# Patient Record
Sex: MALE | Race: WHITE | Employment: FULL TIME | URBAN - METROPOLITAN AREA
[De-identification: names, ages, dates, MRNs, and addresses within clinical notes are randomized per-mention and may not be internally consistent; named-entity substitution may affect disease eponyms.]

---

## 2020-11-05 ENCOUNTER — APPOINTMENT (OUTPATIENT)
Dept: GENERAL RADIOLOGY | Age: 55
DRG: 168 | End: 2020-11-05
Payer: COMMERCIAL

## 2020-11-05 ENCOUNTER — HOSPITAL ENCOUNTER (INPATIENT)
Age: 55
LOS: 1 days | Discharge: HOME | DRG: 168 | End: 2020-11-07
Attending: EMERGENCY MEDICINE | Admitting: INTERNAL MEDICINE
Payer: COMMERCIAL

## 2020-11-05 ENCOUNTER — APPOINTMENT (OUTPATIENT)
Dept: CT IMAGING | Age: 55
DRG: 168 | End: 2020-11-05
Payer: COMMERCIAL

## 2020-11-05 LAB
A/G RATIO: 1.4 (ref 1.1–2.2)
ALBUMIN SERPL-MCNC: 3.8 G/DL (ref 3.4–5)
ALP BLD-CCNC: 75 U/L (ref 40–129)
ALT SERPL-CCNC: 64 U/L (ref 10–40)
ANION GAP SERPL CALCULATED.3IONS-SCNC: 17 MMOL/L (ref 3–16)
AST SERPL-CCNC: 83 U/L (ref 15–37)
BASOPHILS ABSOLUTE: 0 K/UL (ref 0–0.2)
BASOPHILS RELATIVE PERCENT: 0.4 %
BILIRUB SERPL-MCNC: 0.4 MG/DL (ref 0–1)
BUN BLDV-MCNC: 15 MG/DL (ref 7–20)
CALCIUM SERPL-MCNC: 8.7 MG/DL (ref 8.3–10.6)
CHLORIDE BLD-SCNC: 101 MMOL/L (ref 99–110)
CO2: 20 MMOL/L (ref 21–32)
CREAT SERPL-MCNC: 1 MG/DL (ref 0.9–1.3)
EOSINOPHILS ABSOLUTE: 0 K/UL (ref 0–0.6)
EOSINOPHILS RELATIVE PERCENT: 0.1 %
GFR AFRICAN AMERICAN: >60
GFR NON-AFRICAN AMERICAN: >60
GLOBULIN: 2.7 G/DL
GLUCOSE BLD-MCNC: 105 MG/DL (ref 70–99)
HCT VFR BLD CALC: 39.5 % (ref 40.5–52.5)
HEMOGLOBIN: 13.5 G/DL (ref 13.5–17.5)
LYMPHOCYTES ABSOLUTE: 1.7 K/UL (ref 1–5.1)
LYMPHOCYTES RELATIVE PERCENT: 23.9 %
MAGNESIUM: 1.9 MG/DL (ref 1.8–2.4)
MCH RBC QN AUTO: 32.4 PG (ref 26–34)
MCHC RBC AUTO-ENTMCNC: 34.1 G/DL (ref 31–36)
MCV RBC AUTO: 95 FL (ref 80–100)
MONOCYTES ABSOLUTE: 0.5 K/UL (ref 0–1.3)
MONOCYTES RELATIVE PERCENT: 7 %
NEUTROPHILS ABSOLUTE: 5 K/UL (ref 1.7–7.7)
NEUTROPHILS RELATIVE PERCENT: 68.6 %
PDW BLD-RTO: 14.5 % (ref 12.4–15.4)
PLATELET # BLD: 175 K/UL (ref 135–450)
PMV BLD AUTO: 7.5 FL (ref 5–10.5)
POTASSIUM SERPL-SCNC: 3.3 MMOL/L (ref 3.5–5.1)
RBC # BLD: 4.16 M/UL (ref 4.2–5.9)
SARS-COV-2, NAAT: NOT DETECTED
SODIUM BLD-SCNC: 138 MMOL/L (ref 136–145)
TOTAL PROTEIN: 6.5 G/DL (ref 6.4–8.2)
TROPONIN: 0.01 NG/ML
WBC # BLD: 7.3 K/UL (ref 4–11)

## 2020-11-05 PROCEDURE — 93005 ELECTROCARDIOGRAM TRACING: CPT | Performed by: EMERGENCY MEDICINE

## 2020-11-05 PROCEDURE — 12002 RPR S/N/AX/GEN/TRNK2.6-7.5CM: CPT

## 2020-11-05 PROCEDURE — 72125 CT NECK SPINE W/O DYE: CPT

## 2020-11-05 PROCEDURE — 99285 EMERGENCY DEPT VISIT HI MDM: CPT

## 2020-11-05 PROCEDURE — 80053 COMPREHEN METABOLIC PANEL: CPT

## 2020-11-05 PROCEDURE — 96360 HYDRATION IV INFUSION INIT: CPT

## 2020-11-05 PROCEDURE — 2580000003 HC RX 258: Performed by: NURSE PRACTITIONER

## 2020-11-05 PROCEDURE — 84484 ASSAY OF TROPONIN QUANT: CPT

## 2020-11-05 PROCEDURE — 6370000000 HC RX 637 (ALT 250 FOR IP): Performed by: NURSE PRACTITIONER

## 2020-11-05 PROCEDURE — 83735 ASSAY OF MAGNESIUM: CPT

## 2020-11-05 PROCEDURE — 71046 X-RAY EXAM CHEST 2 VIEWS: CPT

## 2020-11-05 PROCEDURE — 70450 CT HEAD/BRAIN W/O DYE: CPT

## 2020-11-05 PROCEDURE — 90715 TDAP VACCINE 7 YRS/> IM: CPT | Performed by: NURSE PRACTITIONER

## 2020-11-05 PROCEDURE — 90471 IMMUNIZATION ADMIN: CPT | Performed by: NURSE PRACTITIONER

## 2020-11-05 PROCEDURE — 85025 COMPLETE CBC W/AUTO DIFF WBC: CPT

## 2020-11-05 PROCEDURE — 6360000002 HC RX W HCPCS: Performed by: NURSE PRACTITIONER

## 2020-11-05 PROCEDURE — U0002 COVID-19 LAB TEST NON-CDC: HCPCS

## 2020-11-05 RX ORDER — 0.9 % SODIUM CHLORIDE 0.9 %
1000 INTRAVENOUS SOLUTION INTRAVENOUS ONCE
Status: COMPLETED | OUTPATIENT
Start: 2020-11-05 | End: 2020-11-05

## 2020-11-05 RX ORDER — LEVOTHYROXINE SODIUM ANHYDROUS 100 UG/5ML
50 INJECTION, POWDER, LYOPHILIZED, FOR SOLUTION INTRAVENOUS DAILY
Status: ON HOLD | COMMUNITY
End: 2020-11-07 | Stop reason: HOSPADM

## 2020-11-05 RX ORDER — HYDROCHLOROTHIAZIDE 12.5 MG/1
12.5 CAPSULE, GELATIN COATED ORAL DAILY
COMMUNITY

## 2020-11-05 RX ORDER — AMLODIPINE BESYLATE 10 MG/1
10 TABLET ORAL DAILY
COMMUNITY

## 2020-11-05 RX ORDER — POTASSIUM CHLORIDE 20 MEQ/1
40 TABLET, EXTENDED RELEASE ORAL ONCE
Status: COMPLETED | OUTPATIENT
Start: 2020-11-05 | End: 2020-11-05

## 2020-11-05 RX ORDER — PRAVASTATIN SODIUM 10 MG
10 TABLET ORAL DAILY
COMMUNITY

## 2020-11-05 RX ADMIN — TETANUS TOXOID, REDUCED DIPHTHERIA TOXOID AND ACELLULAR PERTUSSIS VACCINE, ADSORBED 0.5 ML: 5; 2.5; 8; 8; 2.5 SUSPENSION INTRAMUSCULAR at 22:15

## 2020-11-05 RX ADMIN — POTASSIUM CHLORIDE 40 MEQ: 20 TABLET, EXTENDED RELEASE ORAL at 23:20

## 2020-11-05 RX ADMIN — SODIUM CHLORIDE 1000 ML: 9 INJECTION, SOLUTION INTRAVENOUS at 22:12

## 2020-11-06 ENCOUNTER — APPOINTMENT (OUTPATIENT)
Dept: CT IMAGING | Age: 55
DRG: 168 | End: 2020-11-06
Payer: COMMERCIAL

## 2020-11-06 ENCOUNTER — APPOINTMENT (OUTPATIENT)
Dept: VASCULAR LAB | Age: 55
DRG: 168 | End: 2020-11-06
Payer: COMMERCIAL

## 2020-11-06 PROBLEM — E03.9 HYPOTHYROIDISM, ACQUIRED: Status: ACTIVE | Noted: 2019-01-16

## 2020-11-06 PROBLEM — E66.9 OBESITY: Status: ACTIVE | Noted: 2020-11-06

## 2020-11-06 PROBLEM — R55 SYNCOPE: Status: ACTIVE | Noted: 2020-11-06

## 2020-11-06 PROBLEM — R55 SYNCOPE AND COLLAPSE: Status: ACTIVE | Noted: 2020-11-06

## 2020-11-06 LAB
APTT: 27.9 SEC (ref 24.2–36.2)
BILIRUBIN URINE: ABNORMAL
BLOOD, URINE: NEGATIVE
CLARITY: CLEAR
COLOR: YELLOW
EKG ATRIAL RATE: 110 BPM
EKG DIAGNOSIS: NORMAL
EKG P AXIS: 45 DEGREES
EKG P-R INTERVAL: 138 MS
EKG Q-T INTERVAL: 380 MS
EKG QRS DURATION: 106 MS
EKG QTC CALCULATION (BAZETT): 514 MS
EKG R AXIS: -61 DEGREES
EKG T AXIS: -16 DEGREES
EKG VENTRICULAR RATE: 110 BPM
FIBRINOGEN: 374 MG/DL (ref 200–397)
GLUCOSE URINE: NEGATIVE MG/DL
KETONES, URINE: >=80 MG/DL
LEUKOCYTE ESTERASE, URINE: NEGATIVE
LV EF: 55 %
LVEF MODALITY: NORMAL
MICROSCOPIC EXAMINATION: ABNORMAL
NITRITE, URINE: NEGATIVE
PH UA: 5.5 (ref 5–8)
PROTEIN UA: NEGATIVE MG/DL
SPECIFIC GRAVITY UA: 1.02 (ref 1–1.03)
TROPONIN: <0.01 NG/ML
TROPONIN: <0.01 NG/ML
URINE REFLEX TO CULTURE: ABNORMAL
URINE TYPE: ABNORMAL
UROBILINOGEN, URINE: 0.2 E.U./DL

## 2020-11-06 PROCEDURE — 2709999900 HC NON-CHARGEABLE SUPPLY

## 2020-11-06 PROCEDURE — 6360000002 HC RX W HCPCS: Performed by: INTERNAL MEDICINE

## 2020-11-06 PROCEDURE — 2500000003 HC RX 250 WO HCPCS: Performed by: INTERNAL MEDICINE

## 2020-11-06 PROCEDURE — 2580000003 HC RX 258: Performed by: SURGERY

## 2020-11-06 PROCEDURE — 6360000002 HC RX W HCPCS

## 2020-11-06 PROCEDURE — 37211 THROMBOLYTIC ART THERAPY: CPT

## 2020-11-06 PROCEDURE — 02FR3Z0 FRAGMENTATION OF LEFT PULMONARY ARTERY, PERCUTANEOUS APPROACH, ULTRASONIC: ICD-10-PCS | Performed by: SURGERY

## 2020-11-06 PROCEDURE — 93010 ELECTROCARDIOGRAM REPORT: CPT | Performed by: INTERNAL MEDICINE

## 2020-11-06 PROCEDURE — 2000000000 HC ICU R&B

## 2020-11-06 PROCEDURE — 36014 PLACE CATHETER IN ARTERY: CPT

## 2020-11-06 PROCEDURE — 02FQ3Z0 FRAGMENTATION OF RIGHT PULMONARY ARTERY, PERCUTANEOUS APPROACH, ULTRASONIC: ICD-10-PCS | Performed by: SURGERY

## 2020-11-06 PROCEDURE — 2580000003 HC RX 258: Performed by: INTERNAL MEDICINE

## 2020-11-06 PROCEDURE — 93970 EXTREMITY STUDY: CPT

## 2020-11-06 PROCEDURE — 81003 URINALYSIS AUTO W/O SCOPE: CPT

## 2020-11-06 PROCEDURE — 85384 FIBRINOGEN ACTIVITY: CPT

## 2020-11-06 PROCEDURE — 6360000002 HC RX W HCPCS: Performed by: SURGERY

## 2020-11-06 PROCEDURE — 84484 ASSAY OF TROPONIN QUANT: CPT

## 2020-11-06 PROCEDURE — 36592 COLLECT BLOOD FROM PICC: CPT

## 2020-11-06 PROCEDURE — C1769 GUIDE WIRE: HCPCS

## 2020-11-06 PROCEDURE — 71260 CT THORAX DX C+: CPT

## 2020-11-06 PROCEDURE — 99252 IP/OBS CONSLTJ NEW/EST SF 35: CPT | Performed by: SURGERY

## 2020-11-06 PROCEDURE — 36415 COLL VENOUS BLD VENIPUNCTURE: CPT

## 2020-11-06 PROCEDURE — 36591 DRAW BLOOD OFF VENOUS DEVICE: CPT

## 2020-11-06 PROCEDURE — C1887 CATHETER, GUIDING: HCPCS

## 2020-11-06 PROCEDURE — 2500000003 HC RX 250 WO HCPCS: Performed by: SURGERY

## 2020-11-06 PROCEDURE — C1751 CATH, INF, PER/CENT/MIDLINE: HCPCS

## 2020-11-06 PROCEDURE — 6360000004 HC RX CONTRAST MEDICATION: Performed by: INTERNAL MEDICINE

## 2020-11-06 PROCEDURE — 99152 MOD SED SAME PHYS/QHP 5/>YRS: CPT | Performed by: SURGERY

## 2020-11-06 PROCEDURE — 37211 THROMBOLYTIC ART THERAPY: CPT | Performed by: SURGERY

## 2020-11-06 PROCEDURE — 36014 PLACE CATHETER IN ARTERY: CPT | Performed by: SURGERY

## 2020-11-06 PROCEDURE — 85730 THROMBOPLASTIN TIME PARTIAL: CPT

## 2020-11-06 PROCEDURE — 93306 TTE W/DOPPLER COMPLETE: CPT

## 2020-11-06 PROCEDURE — 76937 US GUIDE VASCULAR ACCESS: CPT | Performed by: SURGERY

## 2020-11-06 PROCEDURE — C1894 INTRO/SHEATH, NON-LASER: HCPCS

## 2020-11-06 RX ORDER — SODIUM CHLORIDE 0.9 % (FLUSH) 0.9 %
10 SYRINGE (ML) INJECTION EVERY 12 HOURS SCHEDULED
Status: DISCONTINUED | OUTPATIENT
Start: 2020-11-06 | End: 2020-11-07 | Stop reason: HOSPADM

## 2020-11-06 RX ORDER — ONDANSETRON 2 MG/ML
4 INJECTION INTRAMUSCULAR; INTRAVENOUS EVERY 6 HOURS PRN
Status: DISCONTINUED | OUTPATIENT
Start: 2020-11-06 | End: 2020-11-07 | Stop reason: HOSPADM

## 2020-11-06 RX ORDER — FENTANYL CITRATE 50 UG/ML
INJECTION, SOLUTION INTRAMUSCULAR; INTRAVENOUS
Status: COMPLETED | OUTPATIENT
Start: 2020-11-06 | End: 2020-11-06

## 2020-11-06 RX ORDER — HEPARIN SODIUM 1000 [USP'U]/ML
6700 INJECTION, SOLUTION INTRAVENOUS; SUBCUTANEOUS PRN
Status: DISCONTINUED | OUTPATIENT
Start: 2020-11-06 | End: 2020-11-06

## 2020-11-06 RX ORDER — ACETAMINOPHEN 650 MG/1
650 SUPPOSITORY RECTAL EVERY 6 HOURS PRN
Status: DISCONTINUED | OUTPATIENT
Start: 2020-11-06 | End: 2020-11-07 | Stop reason: HOSPADM

## 2020-11-06 RX ORDER — SODIUM CHLORIDE 9 MG/ML
INJECTION, SOLUTION INTRAVENOUS CONTINUOUS PRN
Status: DISCONTINUED | OUTPATIENT
Start: 2020-11-06 | End: 2020-11-07 | Stop reason: HOSPADM

## 2020-11-06 RX ORDER — SODIUM CHLORIDE 0.9 % (FLUSH) 0.9 %
10 SYRINGE (ML) INJECTION PRN
Status: DISCONTINUED | OUTPATIENT
Start: 2020-11-06 | End: 2020-11-07 | Stop reason: HOSPADM

## 2020-11-06 RX ORDER — AMLODIPINE BESYLATE 5 MG/1
10 TABLET ORAL DAILY
Status: DISCONTINUED | OUTPATIENT
Start: 2020-11-06 | End: 2020-11-07 | Stop reason: HOSPADM

## 2020-11-06 RX ORDER — SODIUM CHLORIDE 9 MG/ML
INJECTION, SOLUTION INTRAVENOUS CONTINUOUS
Status: DISCONTINUED | OUTPATIENT
Start: 2020-11-06 | End: 2020-11-07 | Stop reason: HOSPADM

## 2020-11-06 RX ORDER — PRAVASTATIN SODIUM 20 MG
10 TABLET ORAL DAILY
Status: DISCONTINUED | OUTPATIENT
Start: 2020-11-06 | End: 2020-11-07 | Stop reason: HOSPADM

## 2020-11-06 RX ORDER — HEPARIN SODIUM 10000 [USP'U]/100ML
250 INJECTION, SOLUTION INTRAVENOUS CONTINUOUS
Status: DISCONTINUED | OUTPATIENT
Start: 2020-11-06 | End: 2020-11-07

## 2020-11-06 RX ORDER — LEVOTHYROXINE SODIUM 0.03 MG/1
50 TABLET ORAL DAILY
Status: DISCONTINUED | OUTPATIENT
Start: 2020-11-06 | End: 2020-11-07 | Stop reason: HOSPADM

## 2020-11-06 RX ORDER — HEPARIN SODIUM 1000 [USP'U]/ML
3300 INJECTION, SOLUTION INTRAVENOUS; SUBCUTANEOUS PRN
Status: DISCONTINUED | OUTPATIENT
Start: 2020-11-06 | End: 2020-11-06

## 2020-11-06 RX ORDER — PROMETHAZINE HYDROCHLORIDE 25 MG/1
12.5 TABLET ORAL EVERY 6 HOURS PRN
Status: DISCONTINUED | OUTPATIENT
Start: 2020-11-06 | End: 2020-11-07 | Stop reason: HOSPADM

## 2020-11-06 RX ORDER — MIDAZOLAM HYDROCHLORIDE 5 MG/ML
INJECTION INTRAMUSCULAR; INTRAVENOUS
Status: COMPLETED | OUTPATIENT
Start: 2020-11-06 | End: 2020-11-06

## 2020-11-06 RX ORDER — SODIUM CHLORIDE 9 MG/ML
INJECTION, SOLUTION INTRAVENOUS CONTINUOUS
Status: DISCONTINUED | OUTPATIENT
Start: 2020-11-06 | End: 2020-11-06

## 2020-11-06 RX ORDER — ACETAMINOPHEN 325 MG/1
650 TABLET ORAL EVERY 4 HOURS PRN
Status: DISCONTINUED | OUTPATIENT
Start: 2020-11-06 | End: 2020-11-07 | Stop reason: HOSPADM

## 2020-11-06 RX ORDER — MORPHINE SULFATE 2 MG/ML
2 INJECTION, SOLUTION INTRAMUSCULAR; INTRAVENOUS
Status: DISCONTINUED | OUTPATIENT
Start: 2020-11-06 | End: 2020-11-07 | Stop reason: HOSPADM

## 2020-11-06 RX ORDER — HEPARIN SODIUM 1000 [USP'U]/ML
6700 INJECTION, SOLUTION INTRAVENOUS; SUBCUTANEOUS ONCE
Status: COMPLETED | OUTPATIENT
Start: 2020-11-06 | End: 2020-11-06

## 2020-11-06 RX ORDER — ACETAMINOPHEN 325 MG/1
650 TABLET ORAL EVERY 6 HOURS PRN
Status: DISCONTINUED | OUTPATIENT
Start: 2020-11-06 | End: 2020-11-07 | Stop reason: HOSPADM

## 2020-11-06 RX ORDER — HEPARIN SODIUM 10000 [USP'U]/100ML
15 INJECTION, SOLUTION INTRAVENOUS CONTINUOUS
Status: DISCONTINUED | OUTPATIENT
Start: 2020-11-06 | End: 2020-11-06

## 2020-11-06 RX ORDER — POLYETHYLENE GLYCOL 3350 17 G/17G
17 POWDER, FOR SOLUTION ORAL DAILY PRN
Status: DISCONTINUED | OUTPATIENT
Start: 2020-11-06 | End: 2020-11-07 | Stop reason: HOSPADM

## 2020-11-06 RX ADMIN — HEPARIN SODIUM 250 UNITS/HR: 10000 INJECTION, SOLUTION INTRAVENOUS at 16:30

## 2020-11-06 RX ADMIN — FENTANYL CITRATE 50 MCG: 50 INJECTION, SOLUTION INTRAMUSCULAR; INTRAVENOUS at 16:27

## 2020-11-06 RX ADMIN — Medication 10 ML: at 22:11

## 2020-11-06 RX ADMIN — HEPARIN SODIUM 15 ML/HR: 10000 INJECTION, SOLUTION INTRAVENOUS at 12:35

## 2020-11-06 RX ADMIN — IOPAMIDOL 75 ML: 755 INJECTION, SOLUTION INTRAVENOUS at 09:59

## 2020-11-06 RX ADMIN — ALTEPLASE 1 MG/HR: 2.2 INJECTION, POWDER, LYOPHILIZED, FOR SOLUTION INTRAVENOUS at 16:30

## 2020-11-06 RX ADMIN — MORPHINE SULFATE 2 MG: 2 INJECTION, SOLUTION INTRAMUSCULAR; INTRAVENOUS at 22:09

## 2020-11-06 RX ADMIN — SODIUM CHLORIDE: 9 INJECTION, SOLUTION INTRAVENOUS at 02:02

## 2020-11-06 RX ADMIN — HEPARIN SODIUM 6700 UNITS: 1000 INJECTION INTRAVENOUS; SUBCUTANEOUS at 12:35

## 2020-11-06 RX ADMIN — SODIUM CHLORIDE: 9 INJECTION, SOLUTION INTRAVENOUS at 16:30

## 2020-11-06 RX ADMIN — MIDAZOLAM HYDROCHLORIDE 2 MG: 5 INJECTION INTRAMUSCULAR; INTRAVENOUS at 16:05

## 2020-11-06 RX ADMIN — FENTANYL CITRATE 50 MCG: 50 INJECTION, SOLUTION INTRAMUSCULAR; INTRAVENOUS at 16:04

## 2020-11-06 ASSESSMENT — PAIN SCALES - GENERAL
PAINLEVEL_OUTOF10: 7
PAINLEVEL_OUTOF10: 0

## 2020-11-06 NOTE — ACP (ADVANCE CARE PLANNING)
Provided forms and education on advance directives. Patient states he has a spouse and adult children. He will review forms and call if he wants help completing. No other needs at this time. PRN follow-up.

## 2020-11-06 NOTE — ED NOTES
Cleaned patients wound with dynahex and saline solution. Patted wound dry after cleaning. Carl Rivera NP aware.      Rupali Vizcaino  11/05/20 0390

## 2020-11-06 NOTE — CONSULTS
Vascular Surgery Consultation    Date of Admission:  11/5/2020  8:57 PM  Date of Consultation:  11/6/2020    PCP:  Eneida Tran MD       Reason for consultation: Pulmonary embolus  History of Present Illness: We are asked to see this patient in consultation by Dr. Sofy Her regarding acute PE. Marisol Lacey is a 63 y.o. male who presented with syncopal episode. On evaluation was noted to be short of Breath. CTA was performed consistent with large PE with R Heart strain. Echo consistent with RV dysfunction as well. Patient has no recent trauma, travel history. He does note his brother has had several DVT/PE but is unaware of any hypercoag testing. Past Medical History:  Past Medical History:   Diagnosis Date    Hyperlipidemia     Hypertension     Thyroid disease        Past Surgical History:  No past surgical history on file. Home Medications:   Prior to Admission medications    Medication Sig Start Date End Date Taking? Authorizing Provider   amLODIPine (NORVASC) 10 MG tablet Take 10 mg by mouth daily   Yes Historical Provider, MD   pravastatin (PRAVACHOL) 10 MG tablet Take 10 mg by mouth daily   Yes Historical Provider, MD   levothyroxine (SYNTHROID) 100 MCG injection Infuse 50 mcg intravenously Daily   Yes Historical Provider, MD   hydroCHLOROthiazide (MICROZIDE) 12.5 MG capsule Take 12.5 mg by mouth daily   Yes Historical Provider, MD        Facility Administered Medications:    amLODIPine  10 mg Oral Daily    levothyroxine  50 mcg Oral Daily    pravastatin  10 mg Oral Daily    sodium chloride flush  10 mL Intravenous 2 times per day    influenza virus vaccine  0.5 mL Intramuscular Prior to discharge    heparin (porcine)  6,700 Units Intravenous Once       Allergies:  Patient has no known allergies.      Social History:      Social History     Socioeconomic History    Marital status:      Spouse name: Not on file    Number of children: Not on file    Years of education: Not on file    Highest education level: Not on file   Occupational History    Not on file   Social Needs    Financial resource strain: Not on file    Food insecurity     Worry: Not on file     Inability: Not on file    Transportation needs     Medical: Not on file     Non-medical: Not on file   Tobacco Use    Smoking status: Never Smoker    Smokeless tobacco: Never Used   Substance and Sexual Activity    Alcohol use: Yes     Alcohol/week: 3.0 standard drinks     Types: 3 Shots of liquor per week     Comment: daily    Drug use: Never    Sexual activity: Not on file   Lifestyle    Physical activity     Days per week: Not on file     Minutes per session: Not on file    Stress: Not on file   Relationships    Social connections     Talks on phone: Not on file     Gets together: Not on file     Attends [employer] service: Not on file     Active member of club or organization: Not on file     Attends meetings of clubs or organizations: Not on file     Relationship status: Not on file    Intimate partner violence     Fear of current or ex partner: Not on file     Emotionally abused: Not on file     Physically abused: Not on file     Forced sexual activity: Not on file   Other Topics Concern    Not on file   Social History Narrative    Not on file       Family History:    No family history on file. Review of Systems:  A 14 point review of systems was completed. Pertinent positives identified in the HPI, all other review of systems negative. Physical Examination:    BP (!) 146/91   Pulse 51   Temp 97.9 °F (36.6 °C)   Resp 18   Ht 5' 10\" (1.778 m)   Wt 217 lb 9.6 oz (98.7 kg)   SpO2 94%   BMI 31.22 kg/m²        Admission Weight: 210 lb (95.3 kg)       General appearance: NAD  Eyes: PERRLA  Neck: no JVD, no lymphadenopathy. Respiratory: effort is unlabored, no crackles, wheezes or rubs. Cardiovascular: regular, no murmur. No carotid bruits.    Pulses:    femoral   RIGHT 2 LEFT 2   GI: abdomen soft, nondistended, no organomegaly. Musculoskeletal: strength and tone normal.  Extremities: warm and pink. Skin: no dermatitis or ulceration. Neuro/psychiatric: grossly intact. ASA 1 - Normal health patient    Mallampati Airway Assessment:  Mallampati Class II - (soft palate, fauces & uvula are visible)  MEDICAL DECISION MAKING/TESTING        CT:      FINDINGS:    Pulmonary Arteries: Pulmonary arteries are adequately opacified for    evaluation.  There are extensive occlusive and partially occlusive filling    defects throughout the bilateral main, segmental and subsegmental pulmonary    arterial vessels.  There is evidence of right ventricular strain.           Venous Duplex:    Right    There is no evidence of deep or superficial venous thrombosis involving the    right lower extremity. Left    There is acute totally occluding deep venous thrombosis involving the    popliteal vein, posterior tibial veins and peroneal veins. There is no previous exam for comparison. Labs:   CBC:   Recent Labs     11/05/20 2204   WBC 7.3   HGB 13.5   HCT 39.5*   MCV 95.0        BMP:   Recent Labs     11/05/20 2204      K 3.3*      CO2 20*   BUN 15   CREATININE 1.0   CALCIUM 8.7   MG 1.90     Cardiac Enzymes:   Recent Labs     11/05/20  2204 11/06/20  0202 11/06/20  0822   TROPONINI 0.01 <0.01 <0.01     PT/INR: No results for input(s): PROTIME, INR in the last 72 hours. APTT:   Recent Labs     11/06/20  1135   APTT 27.9     Liver Profile:  Lab Results   Component Value Date    AST 83 11/05/2020    ALT 64 11/05/2020    BILITOT 0.4 11/05/2020    ALKPHOS 75 11/05/2020   No results found for: CHOL, HDL, TRIG  TSH:  No results found for: TSH  UA:   Lab Results   Component Value Date    PHUR 7.48 04/15/2010           Diagnosis:  Acute PE with Right heart strain    Plan: Catheter directed thrombolysis.      I have explained the following to the patient and his/her family: Thrombolysis is the use of medication to dissolve or break down a blood clot that is blocking blood flow. Catheter directed means that the thrombolysis will be carried out by inserting a catheter into the blood vessel to deliver the medication directly to the blood clot. Thrombolysis is performed as an extra step to an angiogram procedure. Thrombolysis is used instead of surgery to treat blood clots. The risks and complications with this procedure can include but are not limited to the following. Common risks and complications include:   Minor pain, bruising and/or infection from the IV cannula. This may require treatment with antibiotics. Pain or discomfort at the puncture site. This may require medication. Minor bleeding or bruising around the catheter. This is usually stopped by applying pressure and/or ice to the catheter insertion site. Failure of the thrombolytic medication to completely dissolve the blood clot. Surgery may be required to remove the blood clot. Less common risks and complications include:  Infection, requiring antibiotics and further treatment. Damage to surrounding structures such as blood vessels, organs and muscles, requiring further treatment. Stroke or spontaneous bleeding in other organs, such as stomach and bowel. This is due to the thrombolytic and blood thinning medications given during the procedure. The procedure will be stopped and surgery may be required to stop the bleeding. A blood clot or excessive bleeding from the puncture site. This may require other treatment and/or corrective surgery. An allergy to injected drugs, requiring further treatment. The procedure may not be possible due to medical and/or technical reasons.

## 2020-11-06 NOTE — ED NOTES
Bed: 01  Expected date:   Expected time:   Means of arrival:   Comments:  Selene Altamirano Flores Albertina  11/05/20 2057

## 2020-11-06 NOTE — ED PROVIDER NOTES
[facility] Emergency Department    CHIEF COMPLAINT  Laceration (fell, laceration on back of head); Fall; and Loss of Consciousness (LOC when going from sitting to standing)      HISTORY OF PRESENT ILLNESS  Kyle Long is a 63 y.o. male who presents to the ED complaining of head injury. Patient reports that he was standing up to reach for the television remote when he fell backwards hitting the back of his head on the corner of the TV stand. Patient did lose consciousness. Patient reports that he did recently have a colonoscopy this past Monday therefore he did have a lot of diarrhea due to the bowel prep. Patient was also admitted 2 months ago for severe hyponatremia. Patient denies any other illness, fever, cough, chest pain, shortness of breath, numbness, tingling, extremity weakness, abdominal pain, emesis, urinary complaints. Patient has no complaints upon arrival to emergency department other than laceration to head. No other complaints, modifying factors or associated symptoms. Nursing notes reviewed. Past Medical History:   Diagnosis Date    Hyperlipidemia     Hypertension     Thyroid disease      No past surgical history on file. No family history on file. Social History     Socioeconomic History    Marital status:      Spouse name: Not on file    Number of children: Not on file    Years of education: Not on file    Highest education level: Not on file   Occupational History    Not on file   Social Needs    Financial resource strain: Not on file    Food insecurity     Worry: Not on file     Inability: Not on file    Transportation needs     Medical: Not on file     Non-medical: Not on file   Tobacco Use    Smoking status: Never Smoker    Smokeless tobacco: Never Used   Substance and Sexual Activity    Alcohol use:  Yes     Alcohol/week: 3.0 standard drinks     Types: 3 Shots of liquor per week     Comment: daily    Drug use: Never    Sexual activity: Not on file   Lifestyle    Physical activity     Days per week: Not on file     Minutes per session: Not on file    Stress: Not on file   Relationships    Social connections     Talks on phone: Not on file     Gets together: Not on file     Attends [employer] service: Not on file     Active member of club or organization: Not on file     Attends meetings of clubs or organizations: Not on file     Relationship status: Not on file    Intimate partner violence     Fear of current or ex partner: Not on file     Emotionally abused: Not on file     Physically abused: Not on file     Forced sexual activity: Not on file   Other Topics Concern    Not on file   Social History Narrative    Not on file     No current facility-administered medications for this encounter. Current Outpatient Medications   Medication Sig Dispense Refill    amLODIPine (NORVASC) 10 MG tablet Take 10 mg by mouth daily      pravastatin (PRAVACHOL) 10 MG tablet Take 10 mg by mouth daily      levothyroxine (SYNTHROID) 100 MCG injection Infuse 50 mcg intravenously Daily      hydroCHLOROthiazide (MICROZIDE) 12.5 MG capsule Take 12.5 mg by mouth daily       No Known Allergies    REVIEW OF SYSTEMS  10 systems reviewed, pertinent positives per HPI otherwise noted to be negative    PHYSICAL EXAM  /82   Pulse 101   Temp 98.1 °F (36.7 °C) (Oral)   Resp (!) 32   Ht 5' 10\" (1.778 m)   Wt 210 lb (95.3 kg)   SpO2 95%   BMI 30.13 kg/m²   GENERAL APPEARANCE: Awake and alert. Cooperative. No acute distress. Vital signs are Stable. Well appearing and non toxic. HEAD: Normocephalic. Laceration of the left parietal scalp. 6 cm well approximated laceration. No mastoid tenderness or rivers sign. EYES: PERRL. EOM's grossly intact. No raccoon eyes. ENT: Mucous membranes are moist.   NECK: Supple. Normal ROM. No cervical spine tenderness palpation. HEART: Tachycardia. Distal pulses are equal and intact.  Cap refill less than 2 seconds. LUNGS: Respirations unlabored. CTAB. Good air exchange. Speaking comfortably in full sentences. ABDOMEN: Soft. Non-distended. Non-tender. No guarding or rebound. EXTREMITIES: No peripheral edema. Moves all extremities equally. All extremities neurovascularly intact. SKIN: Warm and dry. No acute rashes. NEUROLOGICAL: Alert and oriented. No gross facial drooping. Strength 5/5, sensation intact. PSYCHIATRIC: Normal mood and affect. SCREENINGS       RADIOLOGY  Xr Chest (2 Vw)    Result Date: 11/5/2020  EXAMINATION: TWO XRAY VIEWS OF THE CHEST 11/5/2020 9:49 pm COMPARISON: None. HISTORY: ORDERING SYSTEM PROVIDED HISTORY: syncope TECHNOLOGIST PROVIDED HISTORY: Reason for exam:->syncope Reason for Exam: SYNCOPE--PT STOOD FROM A SITTING POSITION AND PASSED OUT; HIT THE BACK OF HIS HEAD FINDINGS: Marginal inspiration is noted. Bibasilar opacities are present, differential considerations include multifocal bacterial, or atypical/viral pneumonia. Heart size appears normal full level of inspiration. Raquel appear prominent, exaggerated by the low lung volumes. Osseous structures appear normal.     Bibasilar opacities, left greater than right. Differential considerations would include multifocal bacterial pneumonia, versus atypical/viral pneumonia     Ct Head Wo Contrast    Result Date: 11/5/2020  EXAMINATION: CT OF THE HEAD WITHOUT CONTRAST  11/5/2020 6:49 pm TECHNIQUE: CT of the head was performed without the administration of intravenous contrast. Dose modulation, iterative reconstruction, and/or weight based adjustment of the mA/kV was utilized to reduce the radiation dose to as low as reasonably achievable.; CT of the cervical spine was performed without the administration of intravenous contrast. Multiplanar reformatted images are provided for review.  Dose modulation, iterative reconstruction, and/or weight based adjustment of the mA/kV was utilized to reduce the radiation dose to as low as reasonably achievable. COMPARISON: None. HISTORY: ORDERING SYSTEM PROVIDED HISTORY: head lac loc TECHNOLOGIST PROVIDED HISTORY: Reason for exam:->head lac loc Has a \"code stroke\" or \"stroke alert\" been called? ->No Reason for Exam: Stood up and blacked out, hit head on tv stand; lac on back of head Acuity: Acute Type of Exam: Initial Relevant Medical/Surgical History: None FINDINGS: HEAD CT: BRAIN/VENTRICLES:  No acute loss of the gray-white matter differentiation is identified to suggest acute or subacute infarct. No masses or hemorrhages within the brain parenchyma are found. No evidence of midline shift. There is minimal periventricular low-attenuation, compatible with chronic small vessel ischemic disease. The intracranial vasculature, including the dural venous sinuses, is within normal limits. ORBITS: No acute orbital abnormalities are identified. SINUSES: The visualized paranasal sinuses and mastoid air cells are clear. SOFT TISSUES/SKULL: Laceration is identified in the left occipital region, with soft tissue gas. No radiopaque foreign bodies. Calvarium is intact. CERVICAL SPINE CT: BONES/ALIGNMENT: No acute fracture or traumatic malalignment. There is mild reversal of the normal cervical lordosis. DEGENERATIVE CHANGES: Multilevel degenerative disc and facet disease noted. No high-grade central canal stenosis is found. SOFT TISSUES: There is no prevertebral soft tissue swelling. Head CT: No acute intracranial abnormality detected. Cervical spine CT: No acute fracture or traumatic malalignment. Mild reversal of the normal cervical lordosis. That may be secondary to patient positioning, but also raises the possibility of muscle spasm.      Ct Cervical Spine Wo Contrast    Result Date: 11/5/2020  EXAMINATION: CT OF THE HEAD WITHOUT CONTRAST  11/5/2020 6:49 pm TECHNIQUE: CT of the head was performed without the administration of intravenous contrast. Dose modulation, iterative reconstruction, and/or reversal of the normal cervical lordosis. That may be secondary to patient positioning, but also raises the possibility of muscle spasm. PROCEDURE:  LACERATION REPAIR  Dory Mixon or their surrogate had an opportunity to ask questions, and the risks, benefits, and alternatives were discussed. The wound was prepped and draped to maintain a sterile field. A local anesthetic was used to completely anesthetize the wound. It was copiously irrigated. It was explored to its depth in a bloodless field with no sign of tendon, nerve, or vascular injury. No foreign bodies were identified. It was closed with 8 staples. There were no complications during the procedure. CONSULTS  IP CONSULT TO HOSPITALIST    ED COURSE/MDM  Patient seen and evaluated. Old records reviewed. Diagnostic testing reviewed and results discussed. I have seen this patient in collaboration with supervising physician Dr. Stas Bradley. We thoroughly discussed the history, physical exam, diagnostic testing and emergency department course. Dory Mixon presented to the ED today with above noted complaints. No intracranial hemorrhage or cervical fracture seen on CT of the head her cervical spine. CBC and CMP unremarkable mild hypokalemia 3.3 otherwise no electrolyte abnormality no acute kidney injury no anemia leukocytosis or bandemia. Troponin is 0.01. There are some EKG changes specifically T wave inversion in a bundle branch block. Chest x-ray shows bibasilar opacities left greater than the right. Given patient's troponin and EKG changes with no comparison and syncopal episode patient was admitted for further observation.       While in ED patient received   Medications   Tetanus-Diphth-Acell Pertussis (BOOSTRIX) injection 0.5 mL (0.5 mLs Intramuscular Given 11/5/20 2215)   0.9 % sodium chloride bolus (0 mLs Intravenous Stopped 11/5/20 2320)   potassium chloride (KLOR-CON M) extended release tablet 40 mEq (40 mEq Oral Given 11/5/20 6080)         A discussion was had with the patient and/or their surrogate regarding diagnosis, diagnostic testing results, treatment/ plan of care. There was shared decision-making between myself as well as the patient and/or their surrogate and we are all in agreement with hospital admisison. There was an opportunity for questions and all questions were answered to the best of my ability and to the satisfaction of the patient and/or patient family.          Results for orders placed or performed during the hospital encounter of 11/05/20   CBC auto differential   Result Value Ref Range    WBC 7.3 4.0 - 11.0 K/uL    RBC 4.16 (L) 4.20 - 5.90 M/uL    Hemoglobin 13.5 13.5 - 17.5 g/dL    Hematocrit 39.5 (L) 40.5 - 52.5 %    MCV 95.0 80.0 - 100.0 fL    MCH 32.4 26.0 - 34.0 pg    MCHC 34.1 31.0 - 36.0 g/dL    RDW 14.5 12.4 - 15.4 %    Platelets 811 951 - 795 K/uL    MPV 7.5 5.0 - 10.5 fL    Neutrophils % 68.6 %    Lymphocytes % 23.9 %    Monocytes % 7.0 %    Eosinophils % 0.1 %    Basophils % 0.4 %    Neutrophils Absolute 5.0 1.7 - 7.7 K/uL    Lymphocytes Absolute 1.7 1.0 - 5.1 K/uL    Monocytes Absolute 0.5 0.0 - 1.3 K/uL    Eosinophils Absolute 0.0 0.0 - 0.6 K/uL    Basophils Absolute 0.0 0.0 - 0.2 K/uL   Comprehensive metabolic panel   Result Value Ref Range    Sodium 138 136 - 145 mmol/L    Potassium 3.3 (L) 3.5 - 5.1 mmol/L    Chloride 101 99 - 110 mmol/L    CO2 20 (L) 21 - 32 mmol/L    Anion Gap 17 (H) 3 - 16    Glucose 105 (H) 70 - 99 mg/dL    BUN 15 7 - 20 mg/dL    CREATININE 1.0 0.9 - 1.3 mg/dL    GFR Non-African American >60 >60    GFR African American >60 >60    Calcium 8.7 8.3 - 10.6 mg/dL    Total Protein 6.5 6.4 - 8.2 g/dL    Alb 3.8 3.4 - 5.0 g/dL    Albumin/Globulin Ratio 1.4 1.1 - 2.2    Total Bilirubin 0.4 0.0 - 1.0 mg/dL    Alkaline Phosphatase 75 40 - 129 U/L    ALT 64 (H) 10 - 40 U/L    AST 83 (H) 15 - 37 U/L    Globulin 2.7 g/dL   Troponin   Result Value Ref Range    Troponin 0.01 <0.01 ng/mL Magnesium   Result Value Ref Range    Magnesium 1.90 1.80 - 2.40 mg/dL   COVID-19   Result Value Ref Range    SARS-CoV-2, NAAT Not Detected Not Detected       I spoke with Dr. Gloria Bryan. We thoroughly discussed the history, physical exam, laboratory and imaging studies, as well as, emergency department course. Based upon that discussion, we've decided to admit Perry Bullard for further observation and evaluation of Faviola Lunsford's syncope, EKG changes. As I have deemed necessary from their history, physical, and studies, I have considered and evaluated Perry Bullard for the following diagnoses: Cardiac arrhythmia, intracranial hemorrhage, sepsis      FINAL IMPRESSION  1. Syncope and collapse    2. Abnormal EKG    3. Hypokalemia    4. Laceration of scalp without foreign body, initial encounter    5. Tachycardia        Vitals:  Blood pressure 109/82, pulse 101, temperature 98.1 °F (36.7 °C), temperature source Oral, resp. rate (!) 32, height 5' 10\" (1.778 m), weight 210 lb (95.3 kg), SpO2 95 %. DISPOSITION  Patient was admitted to the hospital in stable condition. Comment: Please note this report has been produced using speech recognition software and may contain errors related to that system including errors in grammar, punctuation, and spelling, as well as words and phrases that may be inappropriate. If there are any questions or concerns please feel free to contact the dictating provider for clarification.             [id number] Kenia Frank, APRN - CNP  11/06/20 4274

## 2020-11-06 NOTE — ED NOTES
[facility] @ 0948  RE:  abnormal ekg, syncope, head injury per [place], GARTH Duncan called back @ [facility]  11/06/20 0056

## 2020-11-06 NOTE — H&P
Hospital Medicine History & Physical      PCP: Glory Fuentes MD    Date of Admission: 11/5/2020    Date of Service: Pt seen/examined on 6 Nov and Admitted to Inpatient with expected LOS greater than two midnights due to medical therapy. Chief Complaint:  Syncope w/ mild head trauma/laceration s/p sutures in ED      History Of Present Illness:        63 y.o. male w/ family hx of DVT/PE but no known genetic coagulopathy who presented to [facility] with acute episode of true syncope w/ associated head trauma but no c/o associated CP/SOB at the time. He now has exertional dyspnea but is asymptomatic in bed. Patient reports a 2ish week hx of L calf tenderness/pain. The patient denies any fever/chills or other signs/sxs of systemic illness or identifiable aggravating/alleviating factors. Past Medical History:          Diagnosis Date    Hyperlipidemia     Hypertension     Thyroid disease        Past Surgical History:      No past surgical history on file. Medications Prior to Admission:      Prior to Admission medications    Medication Sig Start Date End Date Taking? Authorizing Provider   amLODIPine (NORVASC) 10 MG tablet Take 10 mg by mouth daily   Yes Historical Provider, MD   pravastatin (PRAVACHOL) 10 MG tablet Take 10 mg by mouth daily   Yes Historical Provider, MD   levothyroxine (SYNTHROID) 100 MCG injection Infuse 50 mcg intravenously Daily   Yes Historical Provider, MD   hydroCHLOROthiazide (MICROZIDE) 12.5 MG capsule Take 12.5 mg by mouth daily   Yes Historical Provider, MD       Allergies:  Patient has no known allergies. Social History:      The patient currently lives independently    TOBACCO:   reports that he has never smoked. He has never used smokeless tobacco.  ETOH:   reports current alcohol use of about 3.0 standard drinks of alcohol per week. Family History:      Reviewed in detail and negative for DM, CAD, Cancer, CVA.  Positive as follows:    No family history on file. REVIEW OF SYSTEMS:   Pertinent positives as noted in the HPI. All other systems reviewed and negative. PHYSICAL EXAM:    /87   Pulse 110   Temp 97.7 °F (36.5 °C) (Oral)   Resp 18   Ht 5' 10\" (1.778 m)   Wt 217 lb 9.6 oz (98.7 kg)   SpO2 91%   BMI 31.22 kg/m²     General appearance:  No apparent distress, appears stated age and cooperative. HEENT:  Normal cephalic, atraumatic without obvious deformity. Pupils equal, round, and reactive to light. Extra ocular muscles intact. Conjunctivae/corneas clear. Neck: Supple, with full range of motion. No jugular venous distention. Trachea midline. Respiratory:  Normal respiratory effort. Clear to auscultation, bilaterally without Rales/Wheezes/Rhonchi. Cardiovascular:  Regular rate and rhythm with normal S1/S2 without murmurs, rubs or gallops. Abdomen: Soft, non-tender, non-distended with normal bowel sounds. Musculoskeletal:  No clubbing, cyanosis or edema bilaterally. Full range of motion without deformity. Skin: Skin color, texture, turgor normal.  No rashes or lesions. Neurologic:  Neurovascularly intact without any focal sensory/motor deficits. Cranial nerves: II-XII intact, grossly non-focal.  Psychiatric:  Alert and oriented, thought content appropriate, normal insight  Capillary Refill: Brisk,< 3 seconds   Peripheral Pulses: +2 palpable, equal bilaterally       CXR:  I have reviewed the CXR with the following interpretation: bibasilar ASD L>R  EKG:  I have reviewed the EKG with the following interpretation: Tachycardia    Labs:     Recent Labs     11/05/20 2204   WBC 7.3   HGB 13.5   HCT 39.5*        Recent Labs     11/05/20 2204      K 3.3*      CO2 20*   BUN 15   CREATININE 1.0   CALCIUM 8.7     Recent Labs     11/05/20 2204   AST 83*   ALT 64*   BILITOT 0.4   ALKPHOS 75     No results for input(s): INR in the last 72 hours.   Recent Labs     11/05/20 2204 11/06/20  0202 11/06/20  [id number]   TROPONINI me at [phone].

## 2020-11-06 NOTE — CONSULTS
Pharmacy to Manage Heparin Infusion per [facility]    Dx:Acute PE  Pt wt = 83.3 kg adj  Baseline aPTT =pend    High Dose Heparin Infusion  Heparin 80 units/kg IVP bolus followed by Heparin infusion at 18 units/kg/hr. Recheck aPTT in 6 hours  Goal aPTT = 49-76 seconds.     Initial heparin bolus - 6.7 ml  Initial heparin drip rate - 15 ml/hr  Next aPTT - 11/6 1800

## 2020-11-06 NOTE — CARE COORDINATION
Chart reviewed for possible needs at DC. Pt placed in OBS status 11/5/20 for syncope being followed by IM and cardiology. Per chart, pt IPTA with no indication for PT/OT eval at this time. Pt has PCP and current insurance listed. No immediate DC needs identified at this time. Please consult CM should needs arise.      Daphne Oakes RN

## 2020-11-07 VITALS
OXYGEN SATURATION: 96 % | RESPIRATION RATE: 23 BRPM | SYSTOLIC BLOOD PRESSURE: 140 MMHG | BODY MASS INDEX: 31.15 KG/M2 | TEMPERATURE: 98.1 F | WEIGHT: 217.6 LBS | HEART RATE: 90 BPM | DIASTOLIC BLOOD PRESSURE: 90 MMHG | HEIGHT: 70 IN

## 2020-11-07 LAB
ANION GAP SERPL CALCULATED.3IONS-SCNC: 14 MMOL/L (ref 3–16)
APTT: 30.8 SEC (ref 24.2–36.2)
APTT: 54.7 SEC (ref 24.2–36.2)
BASOPHILS ABSOLUTE: 0 K/UL (ref 0–0.2)
BASOPHILS RELATIVE PERCENT: 0.5 %
BUN BLDV-MCNC: 11 MG/DL (ref 7–20)
CALCIUM SERPL-MCNC: 8 MG/DL (ref 8.3–10.6)
CHLORIDE BLD-SCNC: 108 MMOL/L (ref 99–110)
CO2: 19 MMOL/L (ref 21–32)
CREAT SERPL-MCNC: 0.9 MG/DL (ref 0.9–1.3)
EOSINOPHILS ABSOLUTE: 0 K/UL (ref 0–0.6)
EOSINOPHILS RELATIVE PERCENT: 0.5 %
FIBRINOGEN: 279 MG/DL (ref 200–397)
FIBRINOGEN: 310 MG/DL (ref 200–397)
GFR AFRICAN AMERICAN: >60
GFR NON-AFRICAN AMERICAN: >60
GLUCOSE BLD-MCNC: 89 MG/DL (ref 70–99)
HCT VFR BLD CALC: 33.3 % (ref 40.5–52.5)
HCT VFR BLD CALC: 35.6 % (ref 40.5–52.5)
HEMOGLOBIN: 11.3 G/DL (ref 13.5–17.5)
HEMOGLOBIN: 11.9 G/DL (ref 13.5–17.5)
LYMPHOCYTES ABSOLUTE: 1.7 K/UL (ref 1–5.1)
LYMPHOCYTES RELATIVE PERCENT: 26.1 %
MCH RBC QN AUTO: 32.4 PG (ref 26–34)
MCH RBC QN AUTO: 32.7 PG (ref 26–34)
MCHC RBC AUTO-ENTMCNC: 33.5 G/DL (ref 31–36)
MCHC RBC AUTO-ENTMCNC: 33.8 G/DL (ref 31–36)
MCV RBC AUTO: 96.7 FL (ref 80–100)
MCV RBC AUTO: 96.8 FL (ref 80–100)
MONOCYTES ABSOLUTE: 0.5 K/UL (ref 0–1.3)
MONOCYTES RELATIVE PERCENT: 8.5 %
NEUTROPHILS ABSOLUTE: 4.1 K/UL (ref 1.7–7.7)
NEUTROPHILS RELATIVE PERCENT: 64.4 %
PDW BLD-RTO: 14.4 % (ref 12.4–15.4)
PDW BLD-RTO: 14.4 % (ref 12.4–15.4)
PLATELET # BLD: 120 K/UL (ref 135–450)
PLATELET # BLD: 144 K/UL (ref 135–450)
PMV BLD AUTO: 7.6 FL (ref 5–10.5)
PMV BLD AUTO: 8.2 FL (ref 5–10.5)
POTASSIUM REFLEX MAGNESIUM: 3.7 MMOL/L (ref 3.5–5.1)
RBC # BLD: 3.44 M/UL (ref 4.2–5.9)
RBC # BLD: 3.68 M/UL (ref 4.2–5.9)
SODIUM BLD-SCNC: 141 MMOL/L (ref 136–145)
WBC # BLD: 5.1 K/UL (ref 4–11)
WBC # BLD: 6.4 K/UL (ref 4–11)

## 2020-11-07 PROCEDURE — 6360000002 HC RX W HCPCS: Performed by: INTERNAL MEDICINE

## 2020-11-07 PROCEDURE — 85730 THROMBOPLASTIN TIME PARTIAL: CPT

## 2020-11-07 PROCEDURE — 80048 BASIC METABOLIC PNL TOTAL CA: CPT

## 2020-11-07 PROCEDURE — 6360000002 HC RX W HCPCS: Performed by: SURGERY

## 2020-11-07 PROCEDURE — 36415 COLL VENOUS BLD VENIPUNCTURE: CPT

## 2020-11-07 PROCEDURE — G0008 ADMIN INFLUENZA VIRUS VAC: HCPCS | Performed by: INTERNAL MEDICINE

## 2020-11-07 PROCEDURE — 90686 IIV4 VACC NO PRSV 0.5 ML IM: CPT | Performed by: INTERNAL MEDICINE

## 2020-11-07 PROCEDURE — 2500000003 HC RX 250 WO HCPCS: Performed by: SURGERY

## 2020-11-07 PROCEDURE — 6370000000 HC RX 637 (ALT 250 FOR IP): Performed by: INTERNAL MEDICINE

## 2020-11-07 PROCEDURE — 85025 COMPLETE CBC W/AUTO DIFF WBC: CPT

## 2020-11-07 PROCEDURE — 99232 SBSQ HOSP IP/OBS MODERATE 35: CPT | Performed by: SURGERY

## 2020-11-07 PROCEDURE — 85384 FIBRINOGEN ACTIVITY: CPT

## 2020-11-07 PROCEDURE — 6370000000 HC RX 637 (ALT 250 FOR IP): Performed by: SURGERY

## 2020-11-07 PROCEDURE — 85027 COMPLETE CBC AUTOMATED: CPT

## 2020-11-07 RX ORDER — HEPARIN SODIUM 1000 [USP'U]/ML
6700 INJECTION, SOLUTION INTRAVENOUS; SUBCUTANEOUS PRN
Status: DISCONTINUED | OUTPATIENT
Start: 2020-11-07 | End: 2020-11-07 | Stop reason: HOSPADM

## 2020-11-07 RX ORDER — LEVOTHYROXINE SODIUM 0.05 MG/1
50 TABLET ORAL DAILY
Qty: 30 TABLET | Refills: 3 | Status: SHIPPED | OUTPATIENT
Start: 2020-11-08

## 2020-11-07 RX ORDER — HEPARIN SODIUM 1000 [USP'U]/ML
3300 INJECTION, SOLUTION INTRAVENOUS; SUBCUTANEOUS PRN
Status: DISCONTINUED | OUTPATIENT
Start: 2020-11-07 | End: 2020-11-07 | Stop reason: HOSPADM

## 2020-11-07 RX ORDER — HEPARIN SODIUM 10000 [USP'U]/100ML
15 INJECTION, SOLUTION INTRAVENOUS CONTINUOUS
Status: DISCONTINUED | OUTPATIENT
Start: 2020-11-07 | End: 2020-11-07

## 2020-11-07 RX ADMIN — INFLUENZA A VIRUS A/VICTORIA/2454/2019 IVR-207 (H1N1) ANTIGEN (PROPIOLACTONE INACTIVATED), INFLUENZA A VIRUS A/HONG KONG/2671/2019 IVR-208 (H3N2) ANTIGEN (PROPIOLACTONE INACTIVATED), INFLUENZA B VIRUS B/VICTORIA/705/2018 BVR-11 ANTIGEN (PROPIOLACTONE INACTIVATED), INFLUENZA B VIRUS B/PHUKET/3073/2013 BVR-1B ANTIGEN (PROPIOLACTONE INACTIVATED) 0.5 ML: 15; 15; 15; 15 INJECTION, SUSPENSION INTRAMUSCULAR at 14:50

## 2020-11-07 RX ADMIN — MORPHINE SULFATE 2 MG: 2 INJECTION, SOLUTION INTRAMUSCULAR; INTRAVENOUS at 02:14

## 2020-11-07 RX ADMIN — PRAVASTATIN SODIUM 10 MG: 20 TABLET ORAL at 09:12

## 2020-11-07 RX ADMIN — AMLODIPINE BESYLATE 10 MG: 5 TABLET ORAL at 09:12

## 2020-11-07 RX ADMIN — LEVOTHYROXINE SODIUM 50 MCG: 25 TABLET ORAL at 09:08

## 2020-11-07 RX ADMIN — RIVAROXABAN 15 MG: 15 TABLET, FILM COATED ORAL at 12:32

## 2020-11-07 RX ADMIN — HEPARIN SODIUM 15 ML/HR: 10000 INJECTION, SOLUTION INTRAVENOUS at 02:00

## 2020-11-07 ASSESSMENT — PAIN SCALES - GENERAL
PAINLEVEL_OUTOF10: 3
PAINLEVEL_OUTOF10: 6

## 2020-11-07 NOTE — OP NOTE
[address], [place] [address]                                OPERATIVE REPORT    PATIENT NAME: Yumiko Duncan                     :        1965  MED REC NO:   [id number]                          ROOM:       0205  ACCOUNT NO:   [de-identified]                           ADMIT DATE: 2020  PROVIDER:     Kristan Mendoza MD    DATE OF PROCEDURE:  2020    PREOPERATIVE DIAGNOSIS:  Bilateral pulmonary emboli with cor pulmonale. POSTOPERATIVE DIAGNOSIS:  Bilateral pulmonary emboli with cor pulmonale. PROCEDURE:  1. Ultrasound-guided right femoral venous access x2.  2.  Placement of catheters in the right and left pulmonary arteries. 3.  Institution of pulmonary arterial thrombolysis. SURGEON:  Kristan Mendoza MD    ANESTHESIA:  Local with monitored sedation. INDICATIONS:  The patient is a 16-year-old male who presented with a  syncopal episode and shortness of breath. CT angiogram was performed  showing bilateral pulmonary emboli with significant right heart strain. Echocardiogram was also consistent with acute cor pulmonale. The  patient was brought to the angio suite at this time for placement of  pulmonary artery thrombolytic catheters and institution of thrombolytic  therapy. PROCEDURE:  The patient was brought to the angio suite, placed in a  supine position. Under my direct supervision, Versed and fentanyl were  administered for moderate sedation. The patient was monitored by an  independent trained nurse observer using continuous blood pressure, EKG  and pulse oximetry. I spent 25 minutes face-to-face with the patient  providing and directing sedation. The right femoral region was prepped and draped in a sterile fashion. Ultrasound was used to identify the common femoral vein. This was  patent, easily compressible and free of thrombus.   Under direct  ultrasound guidance, two 6-Turkish introducer sheaths were placed in the  femoral vein. A copy of the ultrasound image was saved and placed in  the patient's record. Through the sheaths, Bentson wires were advanced  into the inferior vena cava and then directed using a pigtail catheter  through the right atrium, right ventricle and pulmonary outflow tract. One wire was advanced into the right lower lobe pulmonary artery and the  other catheter in the left lobe pulmonary artery guided using an angled  glide catheter. Over the wires, 12 cm infusion length EKOS thrombolytic  catheters were advanced. The ultrasound wires were then placed within  the EKOS catheters. The sheaths were secured to the skin using 2-0 silk  suture and the catheter secured to the sheath using Steri-Strips. Sterile occlusive dressing was then applied. Thrombolytic therapy was  then instituted through each catheter at 1 mg per hour. There were no  complications to this procedure. At the end of the procedure, estimated blood loss was minimal and the  patient was transferred to the recovery area in a stable and awake  condition.         Angie Catalan MD    D: 11/06/2020 18:38:57       T: 11/06/2020 18:48:21     [email or web address]  Job#: [id number]     Doc#: [id number]    CC:

## 2020-11-07 NOTE — DISCHARGE SUMMARY
Hospital Medicine Discharge Summary    Jerald Rick  :  1965  MRN:  [id number]    Admit date:  2020  Discharge date:  2020    Admitting Physician:  Dylan Davila MD  Primary Care Physician:  Jeremiah Connor MD  Code Status:   Full Code  Status: Inpatient [101]  Discharged Condition: Stable  Activity: activity as tolerated  Diet:  DIET GENERAL;      Discharge Diagnoses:      Extensive bilateral pulmonary emboli with cor pulmonale    Syncope    Essential hypertension    Hypothyroid    Syncope and collapse    Head trauma with laceration left posterior scalp requiring staples (remove after 10 days)    Hospital Course:   63 y.o. male admitted with extensive bilateral pulmonary embolus with cor pulmonale demonstrated by RV dilatation and hypokineses by echocardiogram treated with alteplase infusion via bilateral PA EKOS catheters by vascular surgery (, Dr. Zahira Richard). Episode of acute syncope preceded admission. 1. Bilateral pulmonary emboli with cor pulmonale. Alteplase infusion via right and left pulmonary artery catheters placed by vascular surgery () and transitioned to IV heparin gtt, followed by rivaroxaban 15 mg BID x 21 days and then 20 mg daily. 2. Syncope with head trauma requiring staples in ED:  CT head negative. 3. Hypertension without known CAD:  Continues on amlodipine 10 mg daily. 4. Hypothyroid:  Continues on levothyroxine 50 mcg daily. 5. Dyslipidemia:  Continues on pravastatin 10 mg nightly.   6. Class I obesity (BMI 50.5) complicates medical management    Discharge Medications:  New scripts in [facility] (Ankitoger, Caroleen)  amLODIPine (NORVASC) 10 MG tablet  Take 10 mg by mouth daily     hydroCHLOROthiazide (MICROZIDE) 12.5 MG capsule  Take 12.5 mg by mouth daily     levothyroxine (SYNTHROID) 50 MCG tablet  Take 1 tablet by mouth Daily     pravastatin (PRAVACHOL) 10 MG tablet  Take 10 mg by mouth daily     rivaroxaban (XARELTO) 15 MG TABS tablet  Take 1 tablet by mouth 2 times daily for 21 days     rivaroxaban (XARELTO) 20 MG TABS tablet  Take 1 tablet by mouth daily (start after 21 days) new script, #30, RF-1         Consult:   Vascular Surgery (Dr. Zeno Hodgkin)    Significant Diagnostic Studies:    Echocardiogram (11/5) Left ventricular systolic function is normal with ejection fraction estimated at 55%. There is diastolic septal flattening consistent with right ventricular volume overload. Grade I diastolic dysfunction with normal left ventricular filling pressure. The right ventricle is moderately enlarged. Calhoun's sign is noted. Right ventricular lateral wall is akinetic,   overall severe RV dysfunction. Right ventricular apex is spared. This finding can be related to PE. The right atrium is moderately dilated. Mild mitral regurgitation. Moderate tricuspid regurgitation. Systolic pulmonary artery pressure (SPAP) is estimated at 62 mmHg consistent  with severe pulmonary hypertension (Right atrial pressure of 3 mmHg). CTA (11/6) Extensive acute bilateral pulmonary emboli with associated right ventricular strain. Portable CXR (11/5) Bibasilar opacities, left greater than right.  Differential considerations would include multifocal bacterial pneumonia, versus atypical/viral pneumonia     Treatments:   Alteplase infusion, via bilateral PA EKOS catheters by vascular surgery (11/6, Dr. Markus Jackson). Disposition:   Home with self care, follow up with vascular surgery in the next 1-2 weeks  Follow up with [facility], MD in 1-2 weeks. Plan for hematology evaluation in the next few months. Likely lifelong anticoagulation. Left posterior scalp staple removal after 10 days.        Signed:  VIANEY MONCADA  11/7/2020, 2:36 PM

## 2020-11-07 NOTE — PROGRESS NOTES
[address] [facility] Radiology called writer to discuss findings with Prince Avery ALLEN present and accepted phone call and was made aware of CT findings. Awaiting orders.
4 Eyes Skin Assessment     The patient is being assess for   Shift Handoff    I agree that 2 RN's have performed a thorough Head to Toe Skin Assessment on the patient. ALL assessment sites listed below have been assessed. Areas assessed by both nurses:   [x]   Head, Face, and Ears   [x]   Shoulders, Back, and Chest, Abdomen  [x]   Arms, Elbows, and Hands   [x]   Coccyx, Sacrum, and Ischium  [x]   Legs, Feet, and Heels    SHARE this note so that the co-signing nurse is able to place an eSignature**    Co-signer eSignature: {Esignature:[id number]}    Does the Patient have Skin Breakdown?   Yes LDA WOUND CARE was Initiated documentation include the Jennifer-wound, Wound Assessment, Measurements, Dressing Treatment, Drainage, and Color\",          Jamil Prevention initiated:  Yes   Wound Care Orders initiated:  Yes      [id number]  nurse consulted for Pressure Injury (Stage 3,4, Unstageable, DTI, NWPT, Complex wounds)and New or Established Ostomies:  No      Primary Nurse eSignature: Electronically signed by Oliver Corbin RN on 11/6/20 at 11:11 PM EST
Cathflo stopped 0030 Sheath pulled 0100 with manual pressure held until 0130. No redness bleeding hematoma at the site.  Heparin restarted at 0200 15ml/hr Pt tolerated well
Patient admitted to room 205 from ED. Patient oriented to room, call light, bed rails, phone, lights and bathroom. Patient instructed about the schedule of the day including: vital sign frequency, lab draws, possible tests, frequency of MD and staff rounds, including RN/MD rounding together at bedside, daily weights, and I &O's. Patient instructed about prescribed diet, how to use 8MENU, and television. No bed alarm in place, patient aware of placement and reason. Yes Telemetry box in place, patient aware of placement and reason. Bed locked, in lowest position, side rails up 2/4, call light within reach. Will continue to monitor.
Patient alert and oriented. Assessment completed. Medications administered. EKOS in place. Dressing has no new drainage. Pedal pulses good. Patient states that he is in pain and that his back hurts. Pain medications ordered. Patient encouraged to lay still. Oncoming RN to be notified of concerns. Monitor patient progress.
Perfect served Coty ALLEN: Lupe Bruno was just completed on the patient. Tech came and got me and said the right side of his heart does not appear to be moving and that massive PEs run in his family. Do you want to order a stat chest CT or any additional orders? Thanks! \"
Vascular    Bilateral PA EKOS Catheters placed and tPA infusion started. Note dictated.
Vascular    CTA, Echo, labs, history reviewed. Will schedule for PA thrombolysis this afternoon.
PRN  acetaminophen (TYLENOL) tablet 650 mg, 650 mg, Oral, Q6H PRN **OR** acetaminophen (TYLENOL) suppository 650 mg, 650 mg, Rectal, Q6H PRN  polyethylene glycol (GLYCOLAX) packet 17 g, 17 g, Oral, Daily PRN  promethazine (PHENERGAN) tablet 12.5 mg, 12.5 mg, Oral, Q6H PRN **OR** ondansetron (ZOFRAN) injection 4 mg, 4 mg, Intravenous, Q6H PRN  influenza quadrivalent split vaccine (FLUZONE;FLUARIX;FLULAVAL;AFLURIA) injection 0.5 mL, 0.5 mL, Intramuscular, Prior to discharge  heparin 25,000 unit in sodium chloride 0.45% 250 mL infusion, 250 Units/hr, Intravenous, Continuous **AND** heparin 25,000 unit in sodium chloride 0.45% 250 mL infusion, 250 Units/hr, Intravenous, Continuous  0.9 % sodium chloride infusion, , Intracatheter, Continuous **AND** 0.9 % sodium chloride infusion, , Intracatheter, Continuous  sodium chloride flush 0.9 % injection 10 mL, 10 mL, Intravenous, 2 times per day  sodium chloride flush 0.9 % injection 10 mL, 10 mL, Intravenous, PRN  acetaminophen (TYLENOL) tablet 650 mg, 650 mg, Oral, Q4H PRN  ondansetron (ZOFRAN) injection 4 mg, 4 mg, Intravenous, Q6H PRN  0.9 % sodium chloride infusion, , Intravenous, Continuous PRN  morphine (PF) injection 2 mg, 2 mg, Intravenous, Q2H PRN    ASSESSMENT :  Stable s/p pulmonary EKOS. PLAN:  1. Transition heparin gtt to NOAC  2. Recommend Hematology eval out-pt. Has extensive family Hx of DVT. 3.  OK to resume home BP meds. Follow up with vascular surgery on an as needed basis. Will sign off for now. Can follow up with PCP for anti-coagulation.

## 2021-01-27 RX ORDER — RIVAROXABAN 20 MG/1
TABLET, FILM COATED ORAL
Qty: 30 TABLET | Refills: 0 | Status: SHIPPED | OUTPATIENT
Start: 2021-01-27 | End: 2021-05-24

## 2021-05-24 RX ORDER — RIVAROXABAN 20 MG/1
TABLET, FILM COATED ORAL
Qty: 30 TABLET | Refills: 0 | Status: SHIPPED | OUTPATIENT
Start: 2021-05-24

## 2024-03-17 NOTE — ED PROVIDER NOTES
I independently performed a history and physical on Bernardo Navarro. All diagnostic, treatment, and disposition decisions were made by myself in conjunction with the advanced practice provider. Briefly, this is a 63 y.o. male here for syncopal episode. The patient denies any chest pain, or lightheadedness prior to the fall. He has been intermittently short of breath over the past 2 days. He did sustain a large laceration from the fall. .    On exam, nontoxic-appearing adult male in no acute distress. No pallor or icterus with moist mucous membranes. Heart and lung sounds are normal.  The patient has a approximated suture to the left occipital region. EKG  The Ekg interpreted by me in the absence of a cardiologist shows:  Sinus tachycardia rate of 110 beats pain, [place] interval normal.  Prolonged QRS and QTc. Patient has diffuse T wave inversion in the precordial leads patient has no prior EKG for comparison. Screenings   Cutler Coma Scale  Eye Opening: Spontaneous  Best Verbal Response: Oriented  Best Motor Response: Obeys commands  Radha Coma Scale Score: 15        Critical Time  None       MDM  Adult male who came in after a syncopal episode he sustained laceration. No other injuries seen. Patient does have EKG abnormalities and there is no old for comparison. Given the circumstances the patient will be admitted to the hospital for further evaluation. Patient is agreeable with the treatment plan. Patient Referrals:  No follow-up provider specified. Discharge Medications:  New Prescriptions    No medications on file       FINAL IMPRESSION  1. Syncope and collapse    2. Abnormal EKG    3. Hypokalemia    4. Laceration of scalp without foreign body, initial encounter    5. Tachycardia        Blood pressure 109/82, pulse 101, temperature 98.1 °F (36.7 °C), temperature source Oral, resp. rate (!) 32, height 5' 10\" (1.778 m), weight 210 lb (95.3 kg), SpO2 95 %.      For further details of PATIENTS' [facility] emergency department encounter, please see documentation by advanced practice provider.      Bridgett Murphy MD  11/05/20 [id number] 5 (moderate pain)